# Patient Record
Sex: FEMALE | Race: BLACK OR AFRICAN AMERICAN
[De-identification: names, ages, dates, MRNs, and addresses within clinical notes are randomized per-mention and may not be internally consistent; named-entity substitution may affect disease eponyms.]

---

## 2022-11-24 ENCOUNTER — HOSPITAL ENCOUNTER (EMERGENCY)
Dept: HOSPITAL 97 - ER | Age: 22
Discharge: HOME | End: 2022-11-24
Payer: SELF-PAY

## 2022-11-24 VITALS — OXYGEN SATURATION: 100 % | SYSTOLIC BLOOD PRESSURE: 138 MMHG | DIASTOLIC BLOOD PRESSURE: 100 MMHG | TEMPERATURE: 97.1 F

## 2022-11-24 DIAGNOSIS — H66.91: Primary | ICD-10-CM

## 2022-11-24 DIAGNOSIS — E11.9: ICD-10-CM

## 2022-11-24 PROCEDURE — 96372 THER/PROPH/DIAG INJ SC/IM: CPT

## 2022-11-24 PROCEDURE — 99283 EMERGENCY DEPT VISIT LOW MDM: CPT

## 2022-11-24 NOTE — EDPHYS
Physician Documentation                                                                           

 [facility]                                                                 

Name: Cass Anderson                                                                             

Age: 22 yrs                                                                                       

Sex: Female                                                                                       

: 2000                                                                                   

MRN: [id number]                                                                                   

Arrival Date: 2022                                                                          

Time: 07:38                                                                                       

Account#: [id number]                                                                            

Bed 14                                                                                            

Private MD:                                                                                       

ED Physician Mekhi Engel                                                                         

HPI:                                                                                              

                                                                                             

07:48 This 22 yrs old Black Female presents to ER via Unassigned with complaints of Ear Pain. kb  

07:48 The patient presents with pain, moderate. The complaints affect the right ear. Onset:   kb  

      The symptoms/episode began/occurred last night. Modifying factors: The symptoms are         

      alleviated by nothing, the symptoms are aggravated by nothing. Associated signs and         

      symptoms: The patient has no apparent associated signs or symptoms. Severity of             

      symptoms: At their worst the symptoms were moderate in the emergency department the         

      symptoms are unchanged. The patient has not experienced similar symptoms in the past.       

      The patient has not recently seen a physician.                                              

                                                                                                  

Historical:                                                                                       

- Allergies:                                                                                      

07:49 No Known Allergies;                                                                     ss  

- Home Meds:                                                                                      

07:49 metformin 750 mg Oral Tb24 1 tab once daily [Active];                                   ss  

- PMHx:                                                                                           

07:49 Diabetes mellitus;                                                                      ss  

- PSHx:                                                                                           

07:49 None;                                                                                   ss  

                                                                                                  

- Immunization history:: Adult Immunizations up to date.                                          

- Social history:: Smoking status: Patient denies any tobacco usage or history of.                

                                                                                                  

                                                                                                  

ROS:                                                                                              

07:47 Constitutional: Negative for fever, chills, and weight loss.                            kb  

07:47 ENT: Positive for ear pain.                                                                 

07:47 All other systems are negative.                                                             

                                                                                                  

Exam:                                                                                             

07:47 Constitutional:  This is a well developed, well nourished patient who is awake, alert,  kb  

      and in no acute distress. Head/Face:  Normocephalic, atraumatic. Cardiovascular:            

      Regular rate and rhythm with a normal S1 and S2.  No gallops, murmurs, or rubs.  No         

      pulse deficits. Respiratory:  Respirations even and unlabored. No increased work of         

      breathing. Talking in full sentences Skin:  Warm, dry with normal turgor.  Normal           

      color. MS/ Extremity:  Pulses equal, no cyanosis.  Neurovascular intact.  Full, normal      

      range of motion. Neuro:  Awake and alert, GCS 15, oriented to person, place, time, and      

      situation. Moves all extremities. Normal gait. Psych:  Awake, alert, with orientation       

      to person, place and time.  Behavior, mood, and affect are within normal limits.            

07:47 ENT: External ear(s): are unremarkable, Ear canal(s): are normal, TM's: bulging, on the     

      right, erythema, that is marked, on the right.                                              

                                                                                                  

Vital Signs:                                                                                      

07:47  / 100; Pulse 84; Resp 16; Temp 97.1(A); Pulse Ox 100% on R/A; Weight 83.01 kg;   ss  

      Pain 8/10;                                                                                  

                                                                                                  

MDM:                                                                                              

07:44 Patient medically screened.                                                             kb  

07:47 Data reviewed: vital signs, nurses notes. Data interpreted: Pulse oximetry: on room air kb  

      is 100 %. Interpretation: normal. Counseling: I had a detailed discussion with the          

      patient and/or guardian regarding: the historical points, exam findings, and any            

      diagnostic results supporting the discharge/admit diagnosis, the need for outpatient        

      follow up, a family practitioner, to return to the emergency department if symptoms         

      worsen or persist or if there are any questions or concerns that arise at home.             

                                                                                                  

Administered Medications:                                                                         

08:02 Drug: Rocephin (cefTRIAXone) 1 grams Route: IM; Site: right gluteus;                    vg1 

08:13 Follow up: Response: No adverse reaction                                                vg1 

                                                                                                  

                                                                                                  

Disposition:                                                                                      

15:15 Co-signature as Attending Physician, Mekhi PEARSON was immediately available on-site ms3 

      in the Emergency Department for consultation in the care of the patient.                    

                                                                                                  

Disposition Summary:                                                                              

22 07:48                                                                                    

Discharge Ordered                                                                                 

      Location: Home                                                                          kb  

      Condition: Stable                                                                       kb  

      Diagnosis                                                                                   

        - Otitis media, unspecified, right ear                                                kb  

      Followup:                                                                               kb  

        - With: Emergency Department                                                               

        - When: As needed                                                                          

        - Reason: Worsening of condition                                                           

      Followup:                                                                               kb  

        - With: Private Physician                                                                  

        - When: 2 - 3 days                                                                         

        - Reason: Recheck today's complaints, Continuance of care, Re-evaluation by your           

      physician                                                                                   

      Discharge Instructions:                                                                     

        - Discharge Summary Sheet                                                             kb  

        - Otitis Media, Adult, Easy-to-Read                                                   kb  

      Forms:                                                                                      

        - Medication Reconciliation Form                                                      kb  

        - Thank You Letter                                                                    kb  

        - Antibiotic Education                                                                kb  

        - Prescription Opioid Use                                                             kb  

      Prescriptions:                                                                              

        - Amoxicillin 875 mg Oral Tablet                                                           

            - take 1 tablet by ORAL route every 12 hours for 10 days; 20 tablet; Refills: 0,  kb  

      Product Selection Permitted                                                                 

Signatures:                                                                                       

Grisel Brown FNP-C FNP-Ckb Smirch, Shelby, RN                      RN   Yana Guerra RN                    RN   vg1                                                  

Engel, Mekhi, DO                        DO   ms3                                                  

                                                                                                  

**************************************************************************************************

## 2022-11-24 NOTE — ER
Nurse's Notes                                                                                     

 [facility]                                                                 

Name: Cass Anderson                                                                             

Age: 22 yrs                                                                                       

Sex: Female                                                                                       

: 2000                                                                                   

MRN: [id number]                                                                                   

Arrival Date: 2022                                                                          

Time: 07:38                                                                                       

Account#: [id number]                                                                            

Bed 14                                                                                            

Private MD:                                                                                       

Diagnosis: Otitis media, unspecified, right ear                                                   

                                                                                                  

Presentation:                                                                                     

                                                                                             

07:47 Chief complaint: Patient states: R ear pain that began last night. Denies fever.        ss  

      Coronavirus screen: Client denies travel out of the [place] in the last 14 days. Ebola         

      Screen: Patient denies exposure to infectious person. Patient denies travel to an           

      Ebola-affected area in the 21 days before illness onset. Initial Sepsis Screen: Does        

      the patient meet any 2 criteria? No. Patient's initial sepsis screen is negative. Does      

      the patient have a suspected source of infection? No. Patient's initial sepsis screen       

      is negative. Risk Assessment: Do you want to hurt yourself or someone else? Patient         

      reports no desire to harm self or others. Onset of symptoms was 2022.          

07:47 Method Of Arrival: Ambulatory                                                             

07:47 Acuity: VERN 4                                                                           ss  

                                                                                                  

Historical:                                                                                       

- Allergies:                                                                                      

07:49 No Known Allergies;                                                                     ss  

- Home Meds:                                                                                      

07:49 metformin 750 mg Oral Tb24 1 tab once daily [Active];                                   ss  

- PMHx:                                                                                           

07:49 Diabetes mellitus;                                                                      ss  

- PSHx:                                                                                           

07:49 None;                                                                                   ss  

                                                                                                  

- Immunization history:: Adult Immunizations up to date.                                          

- Social history:: Smoking status: Patient denies any tobacco usage or history of.                

                                                                                                  

                                                                                                  

Screenin:04 Abuse screen: Denies threats or abuse. Nutritional screening: No deficits noted.        vg1 

      Tuberculosis screening: No symptoms or risk factors identified. Fall Risk None              

      identified.                                                                                 

                                                                                                  

Assessment:                                                                                       

07:48 General: Appears in no apparent distress. uncomfortable, Behavior is calm, cooperative. vg1 

      Pain: Complains of pain in right ear Pain currently is 8 out of 10 on a pain scale.         

      Pain began 1 day ago. Neuro: Level of Consciousness is awake, alert, obeys commands,        

      Oriented to person, place, time, situation. Respiratory: Airway is patent Respiratory       

      effort is even, unlabored. GI: No signs and/or symptoms were reported involving the         

      gastrointestinal system. EENT: Tympanic membrane reddened on right ear.                     

                                                                                                  

Vital Signs:                                                                                      

07:47  / 100; Pulse 84; Resp 16; Temp 97.1(A); Pulse Ox 100% on R/A; Weight 83.01 kg;   ss  

      Pain 8/10;                                                                                  

                                                                                                  

ED Course:                                                                                        

07:38 Patient arrived in ED.                                                                  mr  

07:43 Grisel Brown FNP-C is [facility].                                                        kb  

07:43 Mekhi Engel DO is Attending Physician.                                                kb  

07:48 Yana Cartwright, RN is Primary Nurse.                                                  vg1 

07:49 Triage completed.                                                                       ss  

07:49 Arm band placed on right wrist.                                                         ss  

08:04 Patient has correct armband on for positive identification. Bed in low position. Call   vg1 

      light in reach. Side rails up X 1.                                                          

08:04 No provider procedures requiring assistance completed. Patient did not have IV access   vg1 

      during this emergency room visit.                                                           

                                                                                                  

Administered Medications:                                                                         

08:02 Drug: Rocephin (cefTRIAXone) 1 grams Route: IM; Site: right gluteus;                    vg1 

08:13 Follow up: Response: No adverse reaction                                                vg1 

                                                                                                  

                                                                                                  

Medication:                                                                                       

08:04 VIS not applicable for this client.                                                     vg1 

                                                                                                  

Outcome:                                                                                          

07:48 Discharge ordered by MD.                                                                kb  

08:04 Discharged to home ambulatory.                                                          vg1 

08:04 Condition: good                                                                             

08:04 Discharge instructions given to patient, Instructed on discharge instructions, follow       

      up and referral plans. medication usage, Demonstrated understanding of instructions,        

      follow-up care, medications, Prescriptions given X 1.                                       

08:13 Patient left the ED.                                                                    vg1 

                                                                                                  

Signatures:                                                                                       

Grisel Brown FNP-C FNP-[place]                                                   

Trudy Lundberg                                                   

Risa Marcum, MILENA                      RN   Yana Guerra, RN                    RN   vg1                                                  

                                                                                                  

**************************************************************************************************

## 2023-06-25 ENCOUNTER — HOSPITAL ENCOUNTER (EMERGENCY)
Dept: HOSPITAL 97 - ER | Age: 23
Discharge: HOME | End: 2023-06-25
Payer: SELF-PAY

## 2023-06-25 DIAGNOSIS — E11.9: ICD-10-CM

## 2023-06-25 DIAGNOSIS — L03.011: Primary | ICD-10-CM

## 2023-06-25 PROCEDURE — 96372 THER/PROPH/DIAG INJ SC/IM: CPT

## 2023-06-25 PROCEDURE — 99284 EMERGENCY DEPT VISIT MOD MDM: CPT

## 2023-06-25 PROCEDURE — 0H9QX0Z DRAINAGE OF FINGER NAIL WITH DRAINAGE DEVICE, EXTERNAL APPROACH: ICD-10-PCS

## 2023-06-25 NOTE — EDPHYS
Physician Documentation                                                                           

 [facility]                                                                 

Name: Cass Anderson                                                                             

Age: 23 yrs                                                                                       

Sex: Female                                                                                       

: 2000                                                                                   

MRN: [id number]                                                                                   

Arrival Date: 2023                                                                          

Time: 15:00                                                                                       

Account#: [id number]                                                                            

Bed Treatment                                                                                     

Private MD:                                                                                       

ED Physician Bladimir Lazaro                                                                      

HPI:                                                                                              

                                                                                             

18:08 This 23 yrs old Black Female presents to ER via Ambulatory with complaints of thumb     snw 

      infection.                                                                                  

18:08 Onset: The symptoms/episode began/occurred acutely. The patient has experienced a       snw 

      previous episode. The patient has not recently seen a physician.                            

                                                                                                  

Historical:                                                                                       

- Allergies:                                                                                      

15:08 No Known Allergies;                                                                     ll1 

- PMHx:                                                                                           

15:08 diabetes mellitus;                                                                      ll1 

- PSHx:                                                                                           

15:08 None;                                                                                   ll1 

                                                                                                  

- Immunization history:: Client reports having NOT received the Covid vaccine.                    

- Social history:: Smoking status: Patient denies any tobacco usage or history of.                

                                                                                                  

                                                                                                  

ROS:                                                                                              

16:18 Constitutional: Negative for fever, chills, and weight loss, Eyes: Negative for injury, snw 

      pain, redness, and discharge, ENT: Negative for injury, pain, and discharge, Neck:          

      Negative for injury, pain, and swelling, Cardiovascular: Negative for chest pain,           

      palpitations, and edema, Respiratory: Negative for shortness of breath, cough,              

      wheezing, and pleuritic chest pain, Abdomen/GI: Negative for abdominal pain, nausea,        

      vomiting, diarrhea, and constipation, Back: Negative for injury and pain, : Negative      

      for injury, bleeding, discharge, and swelling, MS/Extremity: Negative for injury and        

      deformity, Neuro: Negative for headache, weakness, numbness, tingling, and seizure,         

      Psych: Negative for depression, anxiety, suicide ideation, homicidal ideation, and          

      hallucinations.                                                                             

16:18 Skin: Positive for swelling, of the right thumbnail.                                        

                                                                                                  

Exam:                                                                                             

16:14 Constitutional:  This is a well developed, well nourished patient who is awake, alert,  snw 

      and in no acute distress. Head/Face:  Normocephalic, atraumatic. Eyes:  Pupils equal        

      round and reactive to light, extra-ocular motions intact.  Lids and lashes normal.          

      Conjunctiva and sclera are non-icteric and not injected.  Cornea within normal limits.      

      Periorbital areas with no swelling, redness, or edema. ENT:  Nares patent. No nasal         

      discharge, no septal abnormalities noted.  Tympanic membranes are normal and external       

      auditory canals are clear.  Oropharynx with no redness, swelling, or masses, exudates,      

      or evidence of obstruction, uvula midline.  Mucous membranes moist. Neck:  Trachea          

      midline, no thyromegaly or masses palpated, and no cervical lymphadenopathy.  Supple,       

      full range of motion without nuchal rigidity, or vertebral point tenderness.  No            

      Meningismus. Chest/axilla:  Normal chest wall appearance and motion.  Nontender with no     

      deformity.  No lesions are appreciated. Cardiovascular:  Regular rate and rhythm with a     

      normal S1 and S2.  No gallops, murmurs, or rubs.  Normal PMI, no JVD.  No pulse             

      deficits. Respiratory:  Lungs have equal breath sounds bilaterally, clear to                

      auscultation and percussion.  No rales, rhonchi or wheezes noted.  No increased work of     

      breathing, no retractions or nasal flaring. Abdomen/GI:  Soft, non-tender, with normal      

      bowel sounds.  No distension or tympany.  No guarding or rebound.  No evidence of           

      tenderness throughout. Back:  No spinal tenderness.  No costovertebral tenderness.          

      Full range of motion. MS/ Extremity:  Pulses equal, no cyanosis.  Neurovascular intact.     

       Full, normal range of motion. Neuro:  Awake and alert, GCS 15, oriented to person,         

      place, time, and situation.  Cranial nerves II-XII grossly intact.  Motor strength 5/5      

      in all extremities.  Sensory grossly intact.  Cerebellar exam normal.  Normal gait.         

      Psych:  Awake, alert, with orientation to person, place and time.  Behavior, mood, and      

      affect are within normal limits.                                                            

16:14 Musculoskeletal/extremity: Extremities: all appear grossly normal, with no appreciated      

      pain with palpation, ROM: no acute changes, Circulation is intact in all extremities.       

16:14 Skin: Appearance: normal except for affected area, injury, paronychia to right thumb.       

                                                                                                  

Vital Signs:                                                                                      

15:09  / 86; Pulse 91; Resp 16; Temp 97.9; Pulse Ox 98% ; Weight 84.82 kg; Height 5 ft. ll1 

      0 in. ; Pain 7/10;                                                                          

15:09 Body Mass Index 36.52 (84.82 kg, 152.4 cm)                                              ll1 

15:09 Pain Scale: Adult                                                                       ll1 

                                                                                                  

Procedures:                                                                                       

20:13 I \T\ D: Incision and drainage was performed for an abscess of the right Prepped with     snw

      Betadine, Anesthetized with lido jelly. Incised with needle. Drained moderate amount        

      purulent fluid. Abscess cavity explored. Dressing: bandaid the patient tolerated the        

      procedure well.                                                                             

                                                                                                  

MDM:                                                                                              

15:12 Patient medically screened.                                                             santosh 

18:12 Differential diagnosis: bacterial infection, paronychia, ingrown nail. Data reviewed:   snw 

      vital signs, nurses notes. Counseling: I had a detailed discussion with the patient         

      and/or guardian regarding: the historical points, exam findings, and any diagnostic         

      results supporting the discharge/admit diagnosis, the need for outpatient follow up,        

      for definitive care, to return to the emergency department if symptoms worsen or            

      persist or if there are any questions or concerns that arise at home. Response to           

      treatment: the patient's symptoms have mildly improved after treatment. Special             

      discussion: Based on the history and exam findings, there is no indication for further      

      emergent testing or inpatient evaluation. I discussed with the patient/guardian the         

      need to see the primary care provider for further evaluation of the symptoms.               

                                                                                                  

Administered Medications:                                                                         

16:23 Drug: Lidocaine Mucous Membrane Gel 2 % 1 ea Volume: 15 ml; Route: Mucous Membrane;     cm10

18:21 Not Given (Pt doesn't have a ridee): traMADol PO 50 mg PO once                          cm10

18:26 Drug: Boostrix Tdap IM 0.5 ml Route: IM; Site: right deltoid;                           cm10

18:26 Drug: Doxycycline  mg Route: PO;                                                  cm10

                                                                                                  

                                                                                                  

Disposition Summary:                                                                              

23 18:12                                                                                    

Discharge Ordered                                                                                 

      Location: Home                                                                          snw 

      Condition: Stable                                                                       snw 

      Diagnosis                                                                                   

        - Paronychia                                                                          snw 

      Followup:                                                                               snw 

        - With: Emergency Department                                                               

        - When: As needed                                                                          

        - Reason: Worsening of condition                                                           

      Followup:                                                                               snw 

        - With: Private Physician                                                                  

        - When: 5 - 6 days                                                                         

        - Reason: Recheck today's complaints, Continuance of care, Re-evaluation by your           

      physician                                                                                   

      Discharge Instructions:                                                                     

        - Discharge Summary Sheet                                                             snw 

        - Paronychia                                                                          snw 

      Forms:                                                                                      

        - Medication Reconciliation Form                                                      snw 

        - Thank You Letter                                                                    snw 

        - Antibiotic Education                                                                snw 

        - Prescription Opioid Use                                                             snw 

      Prescriptions:                                                                              

        - Mobic 7.5 mg Oral Tablet                                                                 

            - take 1 tablet by ORAL route once daily take with food; 20 tablet; Refills: 0,   snw 

      Product Selection Permitted                                                                 

        - Doxycycline Hyclate 100 mg Oral Tablet                                                   

            - take 1 tablet by ORAL route every 12 hours; 20 tablet; Refills: 0, Product      snw 

      Selection Permitted                                                                         

Signatures:                                                                                       

Bladimir Lazaro MD MD cha Waters, Shelly, ESTEFANÍA-C                   FNP-Liv Blair, RN                       RN   ll1                                                  

Esther Laboy RN                  RN   cm10                                                 

                                                                                                  

**************************************************************************************************

## 2023-06-25 NOTE — XMS REPORT
Continuity of Care Document

                            Created on:2023



Patient:DEVIN BETANCUR

Sex:Female

:2000

External Reference #:[id number]





Demographics







                          Address                   [address]



                                                    [place] [address]

 

                          Home Phone                [phone]

 

                          Work Phone                [phone]

 

                          Mobile Phone              [phone]

 

                          Email Address             [email or web address]

 

                          Preferred Language        English

 

                          Marital Status            Unknown

 

                          Hoahaoism Affiliation     Unknown

 

                          Race                      Unknown

 

                          Additional Race(s)        Unavailable

 

                          Ethnic Group              Unknown









Author







                          Organization              [facility]

t

 

                          Address                   [address]



                                                    [place] [address]

 

                          Phone                     [phone]









Support







                Name            Relationship    Address         Phone

 

                AMY BARRON               [address] [phone]



                                                [place] [address] 









Care Team Providers







                    Name                Role                Phone

 

                    ZACHARY DUONG  Primary Care Physician Unavailable

 

                    RAE MO     Attending Clinician Unavailable

 

                    RAE Smith Attending Clinician [phone]

 

                    ZACHARY DUONG  Attending Clinician Unavailable

 

                    IGNACIO PANTOJA        Attending Clinician Unavailable

 

                    Dennis Tobarya S  Attending Clinician [phone]

 

                    Cheryle Tobar MD Attending Clinician [phone]

 

                    CHERYLE TOBAR    Attending Clinician Unavailable

 

                    AILYN HERNANDEZ Attending Clinician Unavailable

 

                    Zachary Duong MD Attending Clinician [phone]

 

                    VINAYAK ZHU     Attending Clinician Unavailable

 

                    Lab, Javed Cbc        Attending Clinician Unavailable

 

                    SAMEER BAXTER       Attending Clinician Unavailable

 

                    Sameer Reese   Attending Clinician [phone]

 

                    Shar Tyler MD Attending Clinician [phone]

 

                    SHAR TYLER   Attending Clinician Unavailable

 

                    Doctor Unassigned, No Name Attending Clinician Unavailable

 

                    DARON WEI    Attending Clinician Unavailable

 

                    Jessica Hennessy Attending Clinician [phone]

 

                    AURELIANO LIN    Attending Clinician Unavailable

 

                    Nurse, Gal Adult [facility]gent Attending Clinician Unavailable

 

                    Aureliano Lin MD Attending Clinician [phone]

 

                    Daron Leach Attending Clinician [phone]

 

                    Justin Talley DO Attending Clinician [phone]

 

                    YAMIL RICHMOND    Attending Clinician Unavailable

 

                    Yamil Corado Attending Clinician [phone]

 

                    MARVIN EUBANKS   Attending Clinician Unavailable

 

                    Cem RIOJASP, Nguyen Gamboa Attending Clinician [phone]

 

                    MAE ABRAHAM          Attending Clinician Unavailable

 

                    ZAINA KAMARA        Attending Clinician Unavailable

 

                    Morris JOSÉ, Lizbeth    Attending Clinician [phone]

 

                    LIZBETH CACERES        Attending Clinician Unavailable

 

                    Zachary ABBOTT, Niesha   Attending Clinician Unavailable

 

                    Leighton SOTOMAYOR, Aracely Cadena Attending Clinician [phone]

 

                    MARIE RADER       Attending Clinician Unavailable

 

                    Jonh ABBOTT, Hilary GLASGOW Attending Clinician Unavailable

 

                    Nurse, Pcp [facility]      Attending Clinician Unavailable

 

                    Silas SOTOMAYOR, Junior Attending Clinician [phone]

 

                    JUNIOR BARROS    Attending Clinician Unavailable

 

                    AJAY PETER  Attending Clinician Unavailable

 

                    Jono Smalls RN  Attending Clinician Unavailable

 

                    Pcp, Patient Does Not Have A Attending Clinician [phone]

[phone]

 

                    Natividad Vargas PA-C Attending Clinician [phone]

 

                    NATIVIDAD VARGAS    Attending Clinician Unavailable

 

                    RAE MO     Admitting Clinician Unavailable

 

                    RICK EDWARDS      Admitting Clinician Unavailable

 

                    SAMEER BAXTER       Admitting Clinician Unavailable

 

                    SHAR TYLER   Admitting Clinician Unavailable

 

                    YAMIL RICHMOND    Admitting Clinician Unavailable









Payers







           Payer Name Policy Type Policy Number Effective Date Expiration Date S

ource MEDICAID PENDING            PENDING    2021 00:00:00           

 

 

           [facility]            [id number] 2022 00:00:00            







Problems







       Condition Condition Condition Status Onset  Resolution Last   Treating Co

mments 

Source



       Name   Details Category        Date   Date   Treatment Clinician        



                                                 Date                 

 

       Rubella Rubella Disease Active                              [facility]



       non-immune non-immune                                              it

y of



       status, status,               00:00:                             [place]



       antepartum antepartum               00                                 Me

dical



                                                                      Branch

 

       Maternal Maternal Disease Active                              Unive

rs



       varicella, varicella,                                              it

y of



       non-immune non-immune               00:00:                             [place]

[place]



                                   00                                 Medical



                                                                      Branch

 

       History of History of Disease Active                              U

nivers



       gestationa gestationa               8-23                               it

y of



       l diabetes l diabetes               00:00:                             Te

[place]



       in prior in prior               00                                 Medica

l



       pregnancy, pregnancy,                                                  Br

anch



       currently currently                                                  



       pregnant pregnant                                                  



       in first in first                                                  



       trimester trimester                                                  

 

       Family Family Disease Active                              Univers



       history of history of                                              it

y of



       diabetes diabetes               00:00:                             [place]



       mellitus mellitus               00                                 Medica

l



                                                                      Branch

 

       History of History of Disease Active                              U

nivers



       gestationa gestationa               8                               it

y of



       l      l                    00:00:                             [place]



       hypertensi hypertensi               00                                 Me

dical



       on     on                                                      Branch

 

       Hearing Hearing Disease Active                       Overview: Univ

ers



       loss   loss                 4-30                        Formattin ity of



                                   00:00:                      g of this [place]



                                   00                          note   Medical



                                                               might be Branch



                                                               different 



                                                               from the 



                                                               original. 



                                                               ICD10  



                                                               Diagnosis 



                                                               Term   



                                                               Replacer 



                                                               Utility 







Allergies, Adverse Reactions, Alerts







       Allergy Allergy Status Severity Reaction(s) Onset  Inactive Treating Comm

ents 

Source



       Name   Type                        Date   Date   Clinician        

 

       NO KNOWN Drug   Active                                           [facility]



       ALLERGIE Class                                                   ity of



       S                                                              [facility]







Social History







           Social Habit Start Date Stop Date  Quantity   Comments   Source

 

           History SDOH                                             University o

f



           Alcohol Frequency                                             [place] [facility]

edical



                                                                  Branch

 

           History SDOH                                             University o

f



           Alcohol Std                                             [place] [facility]



           Drinks                                                 Branch

 

           History SDOH                                             University o

f



           Alcohol Binge                                             [place] [facility]

al



                                                                  Branch

 

           Exposure to 2022 Unable to assess            Univers

ity of



           SARS-CoV-2 00:00:00   08:49:00                         [facility]



           (event)                                                Branch

 

           Alcohol intake 2022 Current drinker            Unive

rsity of



                      00:00:00   00:00:00   of alcohol            [place] [facility]



                                            (finding)             [facility]

 

           Alcohol Comment 2021 socially              Universit

y of



                      00:00:00   00:00:00                         [facility]

 

           Tobacco use and 2017 Smokeless tobacco            Un

iversity of



           exposure   00:00:00   00:00:00   non-user              [facility]

 

           Sex Assigned At 2000                       Universit

y of



           Birth      00:00:00   00:00:00                         [facility]









                Smoking Status  Start Date      Stop Date       Source

 

                Never smoked tobacco                                 [facility]







Medications







       Ordered Filled Start  Stop   Current Ordering Indication Dosage Frequency

 Signature

                    Comments            Components          Source



     Medication Medication Date Date Medication? Clinician                (SIG) 

          



     Name Name                                                   

 

     ibuprofen                   600mg      600 mg,           Uni

vers



     (IBU)      9-16 09-16                          Oral,           ity of



     tablet 600      14:45: 14:52                          ONCE, 1           Javed

as



     mg        00   :00                           dose, On           Medical



                                                  Fri            Branch



                                                  22 at           



                                                  0945, ASAP           

 

     maalox:diph      2022- No             15mL      15 mL,           Uni

vers



     enhydrAMINE      5 05-07                          Oral,           ity of



     :lidocaine      03:45: 03:19                          ONCE, 1           Javed

as



     2 % viscous      00   :00                           dose, On           Medi

rafia



     1:1:1                                         Fri 22           Branch



     (FIRST-MOUT                                         at 2245,           



     [facility]                                         Routine           



     oral                                                        



     suspension                                                        



     15 mL                                                        

 

     pantoprazol      0      Yes       [id number] 40mg      Take 1           

Univers



     e         5-06                               tablet by           ity of



     (PROTONIX)      00:00:                               mouth           [place]



     40 mg EC      00                                 daily.           Medical



     tablet                                                        Branch

 

     dicyclomine      0      Yes       [id number] 20mg      Take 1           

Univers



     20 mg      5-06                               tablet by           ity of



     tablet      00:00:                               mouth           [place]



               00                                 every 6           Medical



                                                  (six)           Branch



                                                  hours as           



                                                  needed for           



                                                  Abdominal           



                                                  pain.           

 

     ondansetron      0      Yes       [id number] 4mg       Take 1           

Univers



     (ZOFRAN) 4      5-06                               tablet by           ity 

of



     mg tablet      00:00:                               mouth           [place]



               00                                 every 8           Medical



                                                  (eight)           Branch



                                                  hours as           



                                                  needed for           



                                                  Nausea and           



                                                  Vomiting           



                                                  (N/V).           

 

     pantoprazol      0      Yes       [id number] 40mg      Take 1           

Univers



     e         5-06                               tablet by           ity of



     (PROTONIX)      00:00:                               mouth           [place]



     40 mg EC      00                                 daily.           Medical



     tablet                                                        Branch

 

     dicyclomine      0      Yes       [id number] 20mg      Take 1           

Univers



     20 mg      5-06                               tablet by           ity of



     tablet      00:00:                               mouth           [place]



               00                                 every 6           Medical



                                                  (six)           Branch



                                                  hours as           



                                                  needed for           



                                                  Abdominal           



                                                  pain.           

 

     ondansetron      -0      Yes       [id number] 4mg       Take 1           

Univers



     (ZOFRAN) 4      5-06                               tablet by           ity 

of



     mg tablet      00:00:                               mouth           [place]



               00                                 every 8           Medical



                                                  (eight)           Branch



                                                  hours as           



                                                  needed for           



                                                  Nausea and           



                                                  Vomiting           



                                                  (N/V).           

 

     clindamycin      0 - No        [id number] 300mg      Take 1        

   Univers



     300 mg      4-21 -29                          capsule by           ity of



     capsule      00:00: 04:59                          mouth 2           [place]



               00   :00                           (two)           Medical



                                                  times           Branch



                                                  daily for           



                                                  7 days.           

 

     SITagliptin      -0      Yes       [id number] 100mg      Take 1         

  Univers



     100 mg      4-13                               tablet by           ity of



     tablet      00:00:                               mouth           [place]



               00                                 daily.           Medical



                                                                 Branch

 

     SITagliptin      -0      Yes       [id number] 100mg      Take 1         

  Univers



     100 mg      4-13                               tablet by           ity of



     tablet      00:00:                               mouth           [place]



               00                                 daily.           Medical



                                                                 Branch

 

     SITagliptin      -0      Yes       [id number] 100mg      Take 1         

  Univers



     100 mg      4-13                               tablet by           ity of



     tablet      00:00:                               mouth           [place]



               00                                 daily.           Medical



                                                                 Branch

 

     metformin      -0      Yes       [id number] 2000mg      Take 4          

 Univers



      mg      4-06                               tablets by           ity 

of



     24 hr      00:00:                               mouth           [place]



     tablet      00                                 daily with           Medical



                                                  breakfast.           Branch

 

     metformin      -0      Yes       [id number] 2000mg      Take 4          

 Univers



      mg      4-06                               tablets by           ity 

of



     24 hr      00:00:                               mouth           [place]



     tablet      00                                 daily with           Medical



                                                  breakfast.           Branch

 

     metformin      -      Yes       [id number] 2000mg      Take 4          

 Univers



      mg      4-06                               tablets by           ity 

of



     24 hr      00:00:                               mouth           [place]



     tablet      00                                 daily with           Medical



                                                  breakfast.           Branch







Immunizations







           Ordered    Filled Immunization Date       Status     Comments   [facility]

e



           Immunization Name Name                                        

 

           HPV9                  2019 Completed             University of



                                 00:00:00                         [facility]

 

           HPV9                  2019 Completed             [facility] of



                                 00:00:00                         [facility]

 

           HPV9                  2019 Completed             [facility] of



                                 00:00:00                         [facility]

 

           HPV9                  2019 Completed             [facility] of



                                 00:00:00                         [facility]

 

           HPV9                  2019 Completed             [facility] of



                                 00:00:00                         [facility]

 

           HPV9                  2019 Completed             [facility] of



                                 00:00:00                         [facility]

 

           HPV9                  2018 Completed             [facility] of



                                 00:00:00                         [facility]

 

           HPV9                  2018 Completed             [facility]



                                 00:00:00                         [facility]

 

           HPV9                  2018 Completed             [facility]



                                 00:00:00                         [facility]

 

           TDAP                  2018 Completed             [facility] of



                                 00:00:00                         [facility]

 

           TDAP                  2018 Completed             [facility] of



                                 00:00:00                         [facility]

 

           TDAP                  2018 Completed             [facility] of



                                 00:00:00                         [facility]

 

           Influenza Virus            2013 Completed             Universit

y of



           Vaccine Nasal            00:00:00                         [facility]

 

           Influenza Virus            2013 Completed             Universit

y of



           Vaccine Nasal            00:00:00                         [place] [facility]

al



                                                                  [facility]

 

           Influenza Virus            2013 Completed             Universit

y of



           Vaccine Nasal            00:00:00                         [facility]

 

           HIB 4 Dose Schedule            2000 Completed             Unive

rsity of



                                 00:00:00                         [facility]

 

           HIB 4 Dose Schedule            2000 Completed             Unive

rsity of



                                 00:00:00                         [facility]

 

           MMR                   2000 Completed             University of



                                 00:00:00                         [facility]

 

           Varicella             2000 Completed             [facility]



           (varivax)(chicken            00:00:00                         [place] [facility]

[facility]



           pox)                                                   Branch

 

           DTAP                  2000 Completed             [facility]



                                 00:00:00                         [facility]

 

           HIB 4 Dose Schedule            2000 Completed             Unive

rsity of



                                 00:00:00                         [facility]

 

           Polio (IPV/OPV)            2000 Completed             Universit

y of



                                 00:00:00                         [facility]

 

           MMR                   2000 Completed             [facility]



                                 00:00:00                         [facility]

 

           Varicella             2000 Completed             [facility]



           (varivax)(chicken            00:00:00                         [place] [facility]

[facility]



           pox)                                                   Branch

 

           DTAP                  2000 Completed             [facility]



                                 00:00:00                         [facility]

 

           HIB 4 Dose Schedule            2000 Completed             Unive

rsity of



                                 00:00:00                         [facility]

 

           Polio (IPV/OPV)            2000 Completed             Universit

y of



                                 00:00:00                         [facility]

 

           Hep B, Adol or Pedi            2000 Completed             Unive

rsity of



           Dosage                00:00:00                         [facility]

 

           DTAP                  2000 Completed             [facility]



                                 00:00:00                         [facility]

 

           HIB 4 Dose Schedule            2000 Completed             Unive

rsity of



                                 00:00:00                         [facility]

 

           Polio (IPV/OPV)            2000 Completed             Universit

y of



                                 00:00:00                         [facility]

 

           Hep B, Adol or Pedi            2000 Completed             Unive

rsity of



           Dosage                00:00:00                         [facility]

 

           DTAP                  2000 Completed             [facility]



                                 00:00:00                         [facility]

 

           HIB 4 Dose Schedule            2000 Completed             Unive

rsity of



                                 00:00:00                         [facility]

 

           Polio (IPV/OPV)            2000 Completed             Universit

y of



                                 00:00:00                         [facility]

 

           DTAP                  1999 Completed             [facility]



                                 00:00:00                         [facility]



                                                                  Branch

 

           HIB 4 Dose Schedule            1999 Completed             Unive

rsity of



                                 00:00:00                         [facility]



                                                                  Branch

 

           Polio (IPV/OPV)            1999 Completed             Universit

y of



                                 00:00:00                         [facility]



                                                                  Branch

 

           DTAP                  1999 Completed             [facility]



                                 00:00:00                         [facility]



                                                                  Branch

 

           HIB 4 Dose Schedule            1999 Completed             Unive

rsity of



                                 00:00:00                         [facility]



                                                                  Branch

 

           Polio (IPV/OPV)            1999 Completed             Universit

y of



                                 00:00:00                         [facility]



                                                                  Branch

 

           DTAP                  1999 Completed             [facility]



                                 00:00:00                         [facility]



                                                                  Branch

 

           HIB 4 Dose Schedule            1999 Completed             Unive

rsity of



                                 00:00:00                         [facility]



                                                                  Branch

 

           Polio (IPV/OPV)            1999 Completed             Universit

y of



                                 00:00:00                         [facility]



                                                                  Branch

 

           Hep B, Adol or Pedi            1999 Completed             Unive

rsity of



           Dosage                00:00:00                         [facility]



                                                                  Branch

 

           DTAP                  1999 Completed             [facility]



                                 00:00:00                         [facility]



                                                                  Branch

 

           HIB 4 Dose Schedule            1999 Completed             Unive

rsity of



                                 00:00:00                         [facility]



                                                                  Branch

 

           Polio (IPV/OPV)            1999 Completed             Universit

y of



                                 00:00:00                         [facility]



                                                                  Branch

 

           Hep B, Adol or Pedi            1999 Completed             Unive

rsity of



           Dosage                00:00:00                         [facility]



                                                                  Branch

 

           DTAP                  1999 Completed             [facility]



                                 00:00:00                         [facility]



                                                                  Branch

 

           HIB 4 Dose Schedule            1999 Completed             Unive

rsity of



                                 00:00:00                         [facility]



                                                                  Branch

 

           Polio (IPV/OPV)            1999 Completed             Universit

y of



                                 00:00:00                         [facility]



                                                                  Branch

 

           Hep B, Adol or Pedi            1999 Completed             Unive

rsity of



           Dosage                00:00:00                         [facility]



                                                                  Branch

 

           Hep B, Adol or Pedi            1999 Completed             Unive

rsity of



           Dosage                00:00:00                         [facility]



                                                                  Branch

 

           Hep B, Adol or Pedi            1999 Completed             Unive

rsity of



           Dosage                00:00:00                         [facility]



                                                                  Branch

 

           Hep B, Adol or Pedi            1999 Completed             Unive

rsity of



           Dosage                00:00:00                         [facility]



                                                                  Branch

 

           Hep B, Adol or Pedi            1999 Completed             Unive

rsity of



           Dosage                00:00:00                         [facility]







Vital Signs







             Vital Name   Observation Time Observation Value Comments     Source

 

             Systolic blood 2022 13:50:07 135 mm[Hg]                Univer

sity of



             pressure                                            [facility]

 

             Diastolic blood 2022 13:50:07 68 mm[Hg]                 Unive

rsity of



             pressure                                            [facility]

 

             Heart rate   2022 13:50:07 98 /min                   Universi

ty [facility]

 

             Body temperature 2022 13:50:07 36.11 Filomena                 Univ

ersity of



                                                                 [facility]

 

             Respiratory rate 2022 13:50:07 22 /min                   Univ

ersity of



                                                                 [facility]

 

             Body height  2022 13:38:00 154.9 cm                  Universi

ty of



                                                                 [facility]

 

             Body weight  2022 13:38:00 88.86 kg                  Universi

ty of



                                                                 [facility]

 

             BMI          2022 13:38:00 37.01 kg/m2               Universi

ty [facility]

 

             Oxygen saturation in 2022 13:38:00 100 /min                  

[facility]



             Arterial blood by                                        [place] [facility]

[facility]



             Pulse oximetry                                        Branch

 

             Systolic blood 2022 05:11:00 112 mm[Hg]                Univer

sity of



             [facility]

 

             Diastolic blood 2022 05:11:00 85 mm[Hg]                 Unive

rsity of



             pressure                                            [facility]

 

             Heart rate   2022 05:11:00 85 /min                   Universi

ty [facility]

 

             Respiratory rate 2022 05:11:00 16 /min                   Univ

ersity [facility]

 

             Oxygen saturation in 2022 05:11:00 95 /min                   

[facility]



             Arterial blood by                                        [place] [facility]

[facility]



             Pulse oximetry                                        Branch

 

             Body temperature 2022 02:24:32 36.78 Filomena                 Univ

ersity of



                                                                 [facility]

 

             Body height  2022 01:43:00 154.9 cm                  Universi

ty of



                                                                 [facility]



                                                                 [facility]

 

             Body weight  2022 01:43:00 88.905 kg                 Universi

ty of



                                                                 [facility]



                                                                 [facility]

 

             BMI          2022 01:43:00 37.03 kg/m2               Universi

ty of



                                                                 [facility]







Procedures







                Procedure       Date / Time     Performing Clinician Source



                                Performed                       

 

                XR HAND 3+ VW LEFT 2022 14:13:53 RAE Mo [facility]

 

                CONSENT/REFUSAL FOR 2022 13:37:30 [facility]



                [facility] TREATMENT                 Name            [facility]

 

                XR CHEST 1 VW   2022 02:56:12 Rick Edwards [facility] o

[facility]

 

                LIPASE          2022 02:24:00 Cheryle Tobar [facility]

 

                TROPONIN I      2022 02:24:00 Cheryle Tobar [facility]

 

                COMP. METABOLIC PANEL 2022 02:24:00 Cheryle Tobar [facility]



                ([id number])                                         [facility]

 

                CBC WITH DIFF   2022 02:24:00 Cheryle Tobar [facility]

 

                PROTHROMBIN TIME / INR 2022 02:24:00 Cheryle Tobar [facility]

 

                ACTIVATED PARTIAL 2022 02:24:00 Cheryle Tobar [facility]



                THRMPLAS [facility]

 

                NOTICE OF PRIVACY 2022 01:27:45 Doctor Unassigned, No [facility]



                PRACTICES                       Name            [facility]

 

                CONSENT/REFUSAL FOR 2022 01:25:09 [facility]



                [facility]AGNOS[facility] TREATMENT                 Name            [facility]







Encounters







        Start   End     Encounter Admission Attending Care    Care    Encounter 

Source



        Date/Time Date/Time Type    Type    Clinicians Facility Department ID   

   

 

        2021         Emergency                 [facility]    [id number] 

Univers



        08:52:12                                                         ity of



                                                                        [facility]

 

        2021-10-30         Emergency                 [facility]    [id number] 

Univers



        22:13:37                                                         ity of



                                                                        [facility]

 

        2021-10-30         Emergency                 [facility]    [id number] 

Univers



        20:27:27                                                         ity [facility]

 

        2022 Emergency X       RAE MO [facility]    ERT     [id number]

[id number] Univers



        08:40:00 10:02:00                                                 ity [facility]

 

        2022 Emergency         RAE Mo [facility]    1.2.840.114 [id number]

[id number] Univers



        08:40:00 10:02:00                 [facility]   [facility] [id number]         i

ty of



                                                [place] [id number]         [facility]  [phone]         Medi

rafia



                                                        084             Branch

 

        [facility]       [facility]    1041

[id number] Univers



        10:00:00 10:00:00                 ZACHARY                         [facility]

 

        20[facility]       [facility]    [id number]

[id number] Univers



        10:30:00 10:30:00                 IGNACIO                          [facility]

 

        2022 Emergency         [facility]    1.2.840.1

[id number] 

Univers



        20:45:00 00:18:00                 Cheryle TobarBALJINDER [id number] 

        ity [facility] [id number]         [facility]  [phone]         [address]

 

        2022 Emergency X       CISCO [facility]    ERT     [id number]

[id number] Univers



        20:45:00 00:18:00                 CHERYLE                          [facility]

 

        [facility]       [facility]    1038

[id number] Univers



        13:30:00 13:30:00                 AILYN                         crescencio [facility]

 

        2022 [facility]       [facility]    1038

[id number] Univers



        13:30:00 13:30:00                 AILYN                         [facility]

 

        2022 [facility]         [facility]    1.2.840.114 [id number]

[id number] Univers



        00:00:00 00:00:00 Secure [facility]  [id number]        

 ity of



                                                [place]   [id number]         [facility]    [phone]         Medi

rafia



                                                PRIMARY & 365             Branch



                                                SPECIALTY                 



                                                CARE                    

 

        [facility]    [id number]

[id number] Univers



        11:20:00 11:20:00                 VINAYAK                            [facility]

 

        2022 Outpatient R       MARKO [facility]    [id number]

[id number] Univers



        11:20:00 11:20:00                 [facility]

 

        2022 Technician         Lab, [facility]    1.2.840.11

4 [id number] Univers



        15:15:00 15:30:00 Visit           Rhoda [place] [id number]

         ity of



                                                [place]   [id number]         [facility]    [phone]         Medi

rafia



                                                PRIMARY & 357             Branch



                                                SPECIALTY                 



                                                CARE                    

 

        2022 Outpatient GLORIA DUONG [facility]    1038

[id number] Univers



        13:40:00 15:03:09                 [facility]

 

        2022 Office          Rhoda [facility]    1.2.840.114 [id number]

[id number] Univers



        13:40:00 15:03:09 Visit           [place] [id number]         it

y of



                                                [place]   [id number]         [facility]    [phone]         Medi

rafia



                                                PRIMARY & 365             Branch



                                                SPECIALTY                 



                                                CARE                    

 

        2022 Outpatient GLORIA DUONG [facility]    1038

[id number] Univers



        13:40:00 15:03:09                 [facility]

 

        2022 Outpatient GLORIA DUONG [facility]    1038

[id number] Univers



        13:40:00 15:03:09                 [facility]

 

        2022 Outpatient GLORIA DUONG [facility]    1038

[id number] Univers



        11:00:00 11:00:00                 [facility]

 

        2022 Outpatient GLORIA DUONG [facility]    1037

[id number] Univers



        09:20:00 09:20:00                 [facility]

 

        2022 Outpatient GLORIA DUONG [facility]    1037

[id number] Univers



        09:20:00 09:20:00                 ZACHARY prather [facility]

 

        2022 Refbijan          Rhoda [facility]    1.2.840.114 [id number]

[id number] Univers



        00:00:00 00:00:00                 Zachary [facility]  [id number]         it

y of



                                                [place]   [id number]         [place]

[place]



                                                [place]    [phone]         Medi

rafia



                                                PRIMARY & 365             Branch



                                                SPECIALTY                 



                                                CARE                    

 

        2022 Refbijan          [facility]    1.2.840.114 [id number]

[id number] Univers



        00:00:00 00:00:00                 Zachary [facility]ALTH  [id number]         it

y of



                                                [place]   [id number]         [facility]    [phone]         Medi

rafia



                                                PRIMARY & 365             Branch



                                                SPECIALTY                 



                                                CARE                    

 

        2022 Emergency X       SAMEER BAXTER [facility]    ERT     [id number]

[id number] Univers



        12:43:00 18:40:00                                                 ity of



                                                                        [facility]

 

        2022 Emergency         Sameer Baxter [facility]    1.2.840.114 

[id number] Univers



        12:43:00 18:40:00                 T       HEALTH  [id number]         it

y of



                                                [facility]  [id number]         [facility]    [phone]         [address]                 



                                                ([facility])                   

 

        2022 Patient         Rhoda [facility]    1.2.840.114 [id number]

[id number] Univers



        00:00:00 00:00:00 Secure Msg         Zachary [facility]ALTH  [id number]        

 ity of



                                                [place]   [id number]         [place]

[place]



                                                [place]    [phone]         Medi

rafia



                                                PRIMARY & [facility]             Branch



                                                SPECIALTY                 



                                                CARE                    

 

        2022-02-10 2022-02-10 Outpatient GLORIA HERNANDEZ [facility]    1037

[id number] Univers



        13:00:00 13:00:00                 AILYN prather [facility]

 

        2022-02-10 2022-02-10 Outpatient GLORIA HERNANDEZ [facility]    1037

[id number] Univers



        13:00:00 13:00:00                 AILYN prather [facility]

 

        2022 [facility]         [facility] [place] 1.2.840.114 [id number]

[id number] Univers



        08:31:00 23:59:00 Encounter         Shar MULLEN       350.1.13.10         

ity of



                                                BUILD[place] [id number]         Javed

as



                                                        [phone]         [address]

 

        2022 Outpatient GLORIA TYLER [facility]    ACO     [id number]

[id number] Univers



        00:00:00 23:59:00                 SHAR                         ity [facility]

 

        2022 Technician         Lab, [facility]    1.2.840.11

4 [id number] Univers



        15:30:00 15:45:00 Visit           Rhoda [facility]  350.1.13.10

         ity of



                                                [place]   [id number]         [facility]    [phone]         Medi

rafia



                                                PRIMARY & 357             Branch



                                                SPECIALTY                 



                                                CARE                    

 

        2022 Office          Rhoda [facility]    1.2.840.114 [id number]

[id number] Univers



        14:40:00 15:00:00 Visit           [facility]  350.1.13.10         it

y of



                                                [place]   4.2.7.2.[address] [place] [address]    [phone]         Medi

rafia



                                                PRIMARY & 365             Branch



                                                SPECIALTY                 



                                                CARE                    

 

        2022 Outpatient GLORIA DUONG [facility]    AC     1037

[id number] Univers



        14:40:00 14:40:00                 [facility]

 

        2022 Outpatient GLORIA DUONG [facility]    ACO     1037

[id number] Univers



        14:40:00 14:40:00                 [facility]

 

        2022 [facility]         VALARIE TylerPROSPER 1.2.840.114 [id number]

[id number] Univers



        08:28:00 08:30:00 Encounter         Shar MULLEN       350.1.13.10         

ity of



                                                [facility] 4.2.7.2.686         Javed

as



                                                        [phone]         [address]

 

        2022 Outpatient GLORIA TYLER [facility]    ACO     13032

[id number] Univers



        00:00:00 08:30:00                 SHAR prather of



                                                                        [facility]

 

        2022 Orders          Doctor  SHANTHI    1.2.840.114 [id number]

80 Univers



        00:00:00 00:00:00 Only            Unassigned, KAREN   350.1.13.10       

  ity of



                                        [facility] 4.2.7.2.686         Javed

as



                                                        [phone]         Medi

rafia



                                                        009             Branch

 

        [facility]    1036

[id number] Univers



        10:15:00 10:15:00                 DARON prather [facility]

 

        2021 Emergency         Robertson, TRAUMA  1.2.899.822 [id number]

4807 Univers



        14:06:00 15:04:00                 Jessica KWONG  350.1.13.10         it

y of



                                                        4.2.7.2.686         [place]

[place]



                                                        [phone]         Medi

rafia



                                                        014             Branch

 

        2021-04-10 2021-04-10 Outpatient GLORIA LIN [facility]    [id number]

471 Univers



        12:45:00 12:45:00                 AURELIANO prather [facility]

 

        2021-04-10 2021-04-10 Laboratory         Nurse, Gal [facility]   

 1.2.840.114 

[id number]                                Univers



        12:29:43 12:44:43 Only            Aureliano [place]  350.1.13.10  

       ity of



                                                Pediatric 4.2.7.2.686         [facility]    [phone]         Medi

rafia



                                                        370             Branch

 

        [facility]    1032

[id number] Univers



        09:15:00 09:15:00                 DARON prather of



                                                                        [facility]

 

        2021 Joss Wei [facility]    1.2.840.114 [id number]

[id number] Univers



        00:00:00 00:00:00 Management         Daron   OB/GYN  350.1.13.10        

 ity of



                                                REGIONAL 4.2.7.2.686         Javed

as



                                                MATERNAL [phone]         Med

ical



                                                & CHILD [address] [place] [address]



                                                HEALTH                  



                                                [facility]                 

 

        2021 Azael Wei [facility]    1.2.840.114 [id number]

[id number] Univers



        00:00:00 00:00:00                 Daron   OB/GYN  350.1.13.10         it

y of



                                                REGIONAL 4.2.7.2.686         Javed

as



                                                MATERNAL [phone]         Med

ical



                                                & [address]                 

 

        2021 [facility]          [facility]    1.2.840.114 827

[id number] Univers



        14:56:05 16:42:23 Visit           Daron   OB/GYN  350.1.13.10         it

y of



                                                REGIONAL 4.2.7.2.686         Javed

as



                                                MATERNAL [phone]         Med

ical



                                                & [address]                 

 

        2021 [facility]       [facility]    1031

[id number] Univers



        15:30:00 15:30:00                 DARON                           [facility]

 

        2021 [facility]         [facility]    1.2.840.114 [id number]

17 Univers



        00:00:00 00:00:00 Outreach         [facility] 350.1.13.10         i

ty [facility]    4.2.7.2.686         [place]

[place]



                                                SHARAON [phone]         [facility]



                                                        388             [place]

 

        2021 2021-03-10 [facility]     [id number]

17 Univers



        22:51:00 01:31:00                 YAMIL                         ity [facility]

 

        2021 2021-03-10 Emergency         [place],  TRAUMA  1.2.752.293 [id number]

7582 Univers



        22:51:00 01:31:00                 Yamil [facility]  350.1.13.10         

ity of



                                                        4.2.7.2.686         [place]

[place]



                                                        [phone]         [address]

 

        2021 Outpatient GLORIA EUBANKS [facility]    1030

[id number] Univers



        09:20:00 09:20:00                 MARVIN                          ity [facility]

 

        2021 Emergency         Carissaoman, TRAUMA  1.2.840.114 [id number]

[id number] Univers



        22:13:00 00:00:00                 Nguyen Gamboa Valera  350.1.13.10         

ity of



                                                        4.2.7.2.686         [place]

[place]



                                                        [phone]         Medi

rafia



                                                        014             Branch

 

        2021 Emergency         [place],  TRAUMA  1.2.947.489 [id number]

1191 Univers



        21:08:00 21:48:00                 Yamil [facility]  350.1.13.10         

ity of



                                                        4.2.7.2.686         [place]

[place]



                                                        [phone]         Medi

rafia



                                                        014             Branch

 

        2021-01-15 2021-01-15 Outpatient P       MAE ABRAHAM [facility]    1030

[id number] Univers



        12:45:00 12:45:00                                                 ity [facility]

 

        2021 [facility]       [facility]    [id number]

349 Univers



        14:30:00 14:30:00                 ZAINA                           [facility]

 

        2021 Telephone         JANEEN Caceres 1.2.840.114 [id number]

[id number] Univers



        00:00:00 00:00:00                 [facility] 350.1.13.10         i

ty of



                                                CLINICS 4.2.7.2.686         [place]

[place]



                                                        [phone]         [address]

 

        2020 Outpatient R               [facility]    [id number]

016 Univers



        13:00:00 13[facility]00:00                                                 [facility]

 

        2020 Office          JANEEN Caceres 1.2.616.110 [id number]

7809 Univers



        12:54:29 14:10:03 Visit           [facility] 350.1.13.10         i

ty of



                                                CLINICS 4.2.7.2.686         [place]

[place]



                                                        [phone]         [address]

 

        2020 [facility]       [facility]    [id number]

627 Univers



        13:00:00 13:00:00                 [facility]

 

        2020 Nurse           SHANTHI Sharma    1.2.840.114 [id number]

84 Univers



        00:00:00 00:00:00 Triage          Niesha JONES   350.1.13.10         it

y of



                                                [facility] 4.2.7.2.686         Javed

as



                                                        [phone]         [address]

 

        2020 Orders          Doctor  SHANTHI    1.2.840.114 [id number]

79 Univers



        00:00:00 00:00:00 Only            Unassigned, KAREN   350.1.13.10       

  ity of



                                        [facility] 4.2.7.2.686         Javed

as



                                                        [phone]         Medi

rafia



                                                        009             [place]

 

        2020 [facility]patient R       [facility]    [id number]

847 Univers



        13:45:00 13:45:00                 [facility]                           ity [facility]

 

        2020 Outpatient R               [facility]    [id number]

716 Univers



        08:30:00 08:30:00                                                 ity [facility]

 

        2020 Outpatient R               [facility]    [id number]

153 Univers



        10:30:00 10:30:00                                                 [facility]

 

        2020 Office          JANEEN Caceres 1.2.786.113 [id number]

3891 Univers



        09:08:08 10:29:20 Visit           [facility] 350.1.13.10         i

ty of



                                                [facility] 4.2.7.2.686         [place]

[place]



                                                        [phone]         Medi

rafia



                                                        113             [place]

 

        2020 [facility]       [facility]    [id number]

842 Univers



        09:00:00 09:00:00                 [facility]

 

        2020 Orders          Doctor  SHANTHI    1.2.840.114 [id number]

91 Univers



        00:00:00 00:00:00 Only            Unassigned, KAREN   350.1.13.10       

  ity of



                                        [place] [facility] 4.2.7.2.686         Javed

as



                                                        [phone]         [address]

 

        2020 Outpatient R       [facility]    [id number]

580 Univers



        08:30:00 08:30:00                 [facility]

 

        2020 Telephone         [facility]    1.2.361.756 [id number]

8640 Univers



        00:00:00 00:00:00                 Aracely   PRIMARY 350.1.13.10         it

y of



                                        [facility]    4.2.7.2.686         Javed

as



                                                PAVILLION [phone]         Me

dical



                                                        042             Branch

 

        2020 Outpatient R       MARIE RADER [facility]    102

[id number] Univers



        18:20:00 18:20:00                                                 ity [facility]

 

        2020 Nurse           SHANTHI Borja    1.2.840.114 [id number]

36 Univers



        00:00:00 00:00:00 Triage          Hilary GLASGOW KAREN   350.1.13.10         

ity of



                                                [facility] 4.2.7.2.686         Javed

as



                                                        [phone]         Medi

rafia



                                                        019             [place]

 

        2020 Outpatient R               [facility]    [id number]

617 Univers



        13:25:00 13:25:00                                                 ity [facility]

 

        2020 Nurse           Nurse, Pcp [facility]    1.2.840.114

 [id number] Univers



        13:03:39 10:17:21 Visit           Junior Barros PRIMARY 350.1.13.10  

       ity of



                                                CARE    4.2.7.2.686         [place]

[place]



                                                PAVILLION [phone]         Me

dical



                                                        044             [place]

 

        2020 Outpatient R       SILAS [facility]    [id number]

8156 Univers



        13:00:00 13:00:00                 JUNIOR                         ity [facility]

 

        2020 Case            [facility]    1.2.840.114 [id number]

25 Univers



        00:00:00 00:00:00 Management         Aracely   PRIMARY 350.1.13.10        

 ity [facility]    4.2.7.2.686         Javed

as



                                                PAVILLION [phone]         Me

dical



                                                        044             [place]

 

        2020 Telephone         JANEEN Caceres 1.2.840.114 [id number]

[id number] Univers



        00:00:00 00:00:00                 [facility] 350.1.13.10         i

ty of



                                                [facility] 4.2.7.2.686         [place]

[place]



                                                        [phone]         Medi

rafia



                                                        113             Branch

 

        2020 Outpatient R               [facility]    [id number]

526 Univers



        12:45:00 12:45:00                                                 ity [facility]

 

        2020 Outpatient R       ROME [facility]    1026

[id number] Univers



        17:20:00 17:20:00                 AJAY                         ity [facility]

 

        2020 Nurse           SHANTHI Smalls    1.2.840.114 [id number]

91 Univers



        00:00:00 00:00:00 Triage          Aneatrice KAREN   350.1.13.10         

ity of



                                                HOSPITAL 4.2.7.2.686         Javed

as



                                                        [phone]         Medi

rafia



                                                        019             [place]

 

        2020-04-10 2020-04-10 Nurse           SHANTHI Wadsworth    1.2.840.114 [id number]

19 Univers



        00:00:00 00:00:00 Triage          Patient KAREN   350.1.13.10         it

y of



                                        Does Not HOSPITAL 4.2.7.2.686         [place]

[place]



                                        Have A          [phone]         Medi

rafia



                                                        019             [place]

 

        2020 Telemedici         JANEEN Vargas 1.2.840.114 [id number]

[id number] Univers



        09:06:23 12:39:14 ne Visit         [facility] 350.1.13.10       

  ity of



                                                CLINICS 4.2.7.2.686         [place]

[place]



                                                        [phone]         Medi

rafia



                                                        113             [place]

 

        2020 Outpatient R       JUAN C  [facility]    [id number]

130 Univers



        10:30:00 10:30:00                 [place]IV[place]                         ity 

[facility]

 

        2020 Telephone         JANEEN Caceres 1.2.840.114 [id number]

[id number] Univers



        00:00:00 00:00:00                 [facility] 350.1.13.10         i

ty of



                                                CLINICS 4.2.7.2.686         [place]

[place]



                                                        [phone]         Medi

rafia



                                                        113             [place]

 

        2019 Patient         Doctor  SHANTHI    1.2.840.114 [id number]

17 Univers



        00:00:00 00:00:00 Secure Msg         Unassigned, KAREN   350.1.13.10    

     ity of



                                        [facility] 4.2.7.2.686         Javed

as



                                                        [phone]         Medi

rafia



                                                        044             [place]

 

        2019 Office          JANEEN Caceres 1.2.018.986 1364

0592 Univers



        12:50:54 14:06:18 [facility]           [facility] 350.1.13.10         i

ty of



                                                CLINICS 4.2.7.2.686         [place]

[place]



                                                        [phone]         Medi

rafia



                                                        113             Branch

 

        2019 Orders          Doctor  SHANTHI    1.2.840.114 [id number]

94 Univers



        00:00:00 00:00:00 Only            Unassigned, KAREN   350.1.13.10       

  ity of



                                        [facility] 4.2.7.2.686         Javed

as



                                                        [phone]         Medi

rafia



                                                        009             Branch

 

        2019 Patient         Doctor  SHANTHI    1.2.840.114 [id number]

77 Univers



        00:00:00 00:00:00 Secure Msg         Unassigned, KAREN   350.1.13.10    

     ity of



                                        [facility] 4.2.7.2.686         Javed

as



                                                        [phone]         Medi

rafia



                                                        044             [place]







Results







           Test Description Test Time  Test Comments Results    Result Comments 

Source









                    TROPONIN I          2022 03:39:58 









                      Test Item  Value      Reference Range Interpretation Comme

nts









             TROPONIN I (test code = 0.001 ng/mL  See_Comment                [Au

tomated message] The



             [id number])                                         system which ge

nerated



                                                                 this result tra

nsmitted



                                                                 reference range

: <=0.034.



                                                                 The reference r

ottoniel was



                                                                 not used to int

erpret



                                                                 this result as



                                                                 normal/abnormal

.

 

             HOSSEIN (test code = HOSSEIN) Reference (Normal)                           



                          Range (defined by the                           



                          99th percentile                           



                          reference limit): <=                           



                          0.034 ng/mL Note:                           



                          Cardiac troponin begins                           



                          to rise 3-4 hours after                           



                          the onset of ischemia.                           



                          Repeat in 4-6 hours if                           



                          the sample was drawn                           



                          within 3-4 hours of the                           



                          onset of the symptom                           



                          and found normal.                           



                          Diagnosis of myocardial                           



                          injury is made with                           



                          acute changes in cTn                           



                          concentrations with at                           



                          least one serial sample                           



                          above the 99th                           



                          percentile upper                           



                          reference limit (URL),                           



                          taken together with the                           



                          patient's clinical                           



                          presentation. Biotin                           



                          has been reported to                           



                          cause a negative bias,                           



                          interpret results                           



                          relative to patient's                           



                          use of biotin.                           

 

             Lab Interpretation Normal                                 



             (test code = 96459-9)                                        



[facility]. METABOLIC PANEL (22175)2022 
03:28:25





             Test Item    Value        Reference Range Interpretation Comments

 

             NA (test code = 135 mmol/L   135-145                   



             [id number])                                         

 

             K (test code = 4.7 mmol/L   3.5-5.0                   



             0304715344)                                         

 

             CL (test code = 99 mmol/L                        



             7744680063)                                         

 

             CO2 TOTAL (test code = 24 mmol/L    23-31                     



             4416209209)                                         

 

             AGAP (test code =              2-16                      



             7108601432)                                         

 

             BUN (test code = 11 mg/dL     7-23                      



             7000033555)                                         

 

             GLUCOSE (test code = 403 mg/dL           H            



             0087736367)                                         

 

             CREATININE (test code = 0.52 mg/dL   0.50-1.04                 



             6944764132)                                         

 

             TOTAL BILI (test code = 0.3 mg/dL    0.1-1.1                   



             1800097226)                                         

 

             CALCIUM (test code = 9.8 mg/dL    8.6-10.6                  



             0761861730)                                         

 

             T PROTEIN (test code = 7.5 g/dL     6.3-8.2                   



             1975254565)                                         

 

             ALBUMIN (test code = 4.4 g/dL     3.5-5.0                   



             1601735585)                                         

 

             ALK PHOS (test code = 85 U/L                           



             5558169608)                                         

 

             ALTv (test code = 31 U/L       5-35                      



             1742-6)                                             

 

             AST(SGOT) (test code = 24 U/L       13-40                     



             [id number])                                         

 

             eGFR (test code =              mL/min/1.73m2              



             8509005788)                                         

 

             HOSSEIN (test code = HOSSEIN) Association of                           



                          Glomerular Filtration                           



                          Rate (GFR) and Staging                           



                          of Kidney Disease*                           



                          +---------------------                           



                          --+-------------------                           



                          --+-------------------                           



                          ------+| GFR                           



                          (mL/min/1.73 m2) ?|                           



                          With Kidney Damage ?|                           



                          ?Without Kidney                           



                          Damage+---------------                           



                          --------+-------------                           



                          --------+-------------                           



                          ------------+| ?>90 ?                           



                          ? ? ? ? ? ? ? ?|                           



                          ?Stage one ? ? ? ? ?|                           



                          ? Normal ? ? ? ? ? ? ?                           



                          ?+--------------------                           



                          ---+------------------                           



                          ---+------------------                           



                          -------+| ?60-89 ? ? ?                           



                          ? ? ? ? ?| ?Stage two                           



                          ? ? ? ? ?| ? Decreased                           



                          GFR ? ? ? ?                            



                          +---------------------                           



                          --+-------------------                           



                          --+-------------------                           



                          ------+| ?30-59 ? ? ?                           



                          ? ? ? ? ?| ?Stage                           



                          three ? ? ? ?| ? Stage                           



                          three ? ? ? ? ?                           



                          +---------------------                           



                          --+-------------------                           



                          --+-------------------                           



                          ------+| ?15-29 ? ? ?                           



                          ? ? ? ? ?| ?Stage four                           



                          ? ? ? ? | ? Stage four                           



                          ? ? ? ? ?                              



                          ?+--------------------                           



                          ---+------------------                           



                          ---+------------------                           



                          -------+| ?<15 (or                           



                          dialysis) ? ?| ?Stage                           



                          five ? ? ? ? | ? Stage                           



                          five ? ? ? ? ?                           



                          ?+--------------------                           



                          ---+------------------                           



                          ---+------------------                           



                          -------+ *Each stage                           



                          assumes the associated                           



                          GFR level has been in                           



                          effect for at least                           



                          three months. ?Stages                           



                          1 to 5, with or                           



                          without kidney                           



                          disease, indicate                           



                          chronic kidney                           



                          disease. Notes:                           



                          Determination of                           



                          stages one and two                           



                          (with eGFR                             



                          >59mL/min/1.73 m2)                           



                          requires estimation of                           



                          kidney damage for at                           



                          least three months as                           



                          defined by structural                           



                          or functional                           



                          abnormalities of the                           



                          kidney, manifested by                           



                          either:Pathological                           



                          abnormalities or                           



                          Markers of kidney                           



                          damage (including                           



                          abnormalities in the                           



                          composition of the                           



                          blood or urine or                           



                          abnormalities in                           



                          imaging tests).                           

 

             Lab Interpretation Abnormal                               



             (test code = 68293-7)                                        



[id number]





             Test Item    Value        Reference Range Interpretation Comments

 

             LIPASE (test code = 0264293649) 214 U/L      0-220                 

    

 

             Lab Interpretation (test code = Normal                             

    



             27047-2)                                            



[facility]59





             Test Item    Value        Reference Range Interpretation Comments

 

             APTT Patient (test              See_Comment                [Automat

ed



             code = 3173-2)                                        message] The



                                                                 system which



                                                                 generated this



                                                                 result



                                                                 transmitted



                                                                 reference range

:



                                                                 23 - 38 Seconds

.



                                                                 The reference



                                                                 range was not



                                                                 used to interpr

et



                                                                 this result as



                                                                 normal/abnormal

.

 

             HOSSEIN (test code = HOSSEIN) [facility] patient                           



                          population mean                           



                          normal value for                           



                          aPTT is 30                             



                          seconds.                               

 

             Lab Interpretation Normal                                 



             (test code = 31663-7)                                        



[facility] [id number] 03:06:58





             Test Item    Value        Reference Range Interpretation Comments

 

             PROTIME PATIENT (test              See_Comment                [Auto

mated message]



             code = 5964-2)                                        The system wh

ich



                                                                 generated this 

result



                                                                 transmitted ref

erence



                                                                 range: 12.0 - 1

4.7



                                                                 Seconds. The re

ference



                                                                 range was not u

sed to



                                                                 interpret this 

result



                                                                 as normal/abnor

mal.

 

             INR (test code = 6301-6)                                        Nor

mal INR <1.1;



                                                                 Warfarin Therap

eutic



                                                                 range 2.0 to 3.

0 or



                                                                 2.5 to 3.5, dep

ending



                                                                 upon the indica

tions.

 

             Lab Interpretation (test Normal                                 



             code = 16477-4)                                        



[id number] 02:39:16





             Test Item    Value        Reference Range Interpretation Comments

 

             WBC (test code =              See_Comment                [Automated



             6690-2)                                             message] The sy

stem



                                                                 which generated



                                                                 this result



                                                                 transmitted



                                                                 reference range

:



                                                                 4.30 - 11.10



                                                                 10*3/?L. The



                                                                 reference range

 was



                                                                 not used to



                                                                 interpret this



                                                                 result as



                                                                 normal/abnormal

.

 

             RBC (test code =              See_Comment  H             [Automated



             789-8)                                              message] The sy

stem



                                                                 which generated



                                                                 this result



                                                                 transmitted



                                                                 reference range

:



                                                                 3.93 - 5.25



                                                                 10*6/?L. The



                                                                 reference range

 was



                                                                 not used to



                                                                 interpret this



                                                                 result as



                                                                 normal/abnormal

.

 

             HGB (test code = 13.8 g/dL    11.6-15.0                 



             718-7)                                              

 

             HCT (test code = 42.7 %       35.7-45.2                 



             4544-3)                                             

 

             MCV (test code = 80.9 fL      80.6-95.5                 



             787-2)                                              

 

             MCH (test code = 26.1 pg      25.9-32.8                 



             785-6)                                              

 

             MCHC (test code = 32.3 g/dL    31.6-35.1                 



             786-4)                                              

 

             RDW-SD (test code = 37.9 fL      39.0-49.9    L            



             49885-9)                                            

 

             RDW-CV (test code = 13.0 %       12.0-15.5                 



             788-0)                                              

 

             PLT (test code =              See_Comment                [Automated



             777-3)                                              message] The sy

stem



                                                                 which generated



                                                                 this result



                                                                 transmitted



                                                                 reference range

:



                                                                 166 - 358 10*3/

?L.



                                                                 The reference r

ottoniel



                                                                 was not used to



                                                                 interpret this



                                                                 result as



                                                                 normal/abnormal

.

 

             MPV (test code = 12.6 fL      9.5-12.9                  



             13731-5)                                            

 

             NRBC/100 WBC (test              See_Comment                [Automat

ed



             code = 2544181817)                                        message] 

The system



                                                                 which generated



                                                                 this result



                                                                 transmitted



                                                                 reference range

:



                                                                 0.0 - 10.0 /100



                                                                 WBCs. The refer

ence



                                                                 range was not u

sed



                                                                 to interpret th

is



                                                                 result as



                                                                 normal/abnormal

.

 

             NRBC x10^3 (test code <0.01        See_Comment                [Auto

mated



             = [id number])                                        message] The s

ystem



                                                                 which generated



                                                                 this result



                                                                 transmitted



                                                                 reference range

:



                                                                 10*3/?L. The



                                                                 reference range

 was



                                                                 not used to



                                                                 interpret this



                                                                 result as



                                                                 normal/abnormal

.

 

             GRAN MAT (NEUT) % 61.9 %                                 



             (test code = 770-8)                                        

 

             IMM GRAN % (test code 0.30 %                                 



             = [id number])                                        

 

             LYMPH % (test code = 31.1 %                                 



             736-9)                                              

 

             MONO % (test code = 5.6 %                                  



             5905-5)                                             

 

             EOS % (test code = 0.8 %                                  



             713-8)                                              

 

             BASO % (test code = 0.3 %                                  



             706-2)                                              

 

             GRAN MAT x10^3(ANC) 6.38 10*3/uL 1.88-7.09                 



             (test code =                                        



             [id number])                                         

 

             IMM GRAN x10^3 (test 0.03 10*3/uL 0.00-0.06                 



             code = 9315161235)                                        

 

             LYMPH x10^3 (test code 3.21 10*3/uL 1.32-3.29                 



             = 731-0)                                            

 

             MONO x10^3 (test code 0.58 10*3/uL 0.33-0.92                 



             = 742-7)                                            

 

             EOS x10^3 (test code = 0.08 10*3/uL 0.03-0.39                 



             711-2)                                              

 

             BASO x10^3 (test code 0.03 10*3/uL 0.01-0.07                 



             = 704-7)                                            

 

             Lab Interpretation Abnormal                               



             (test code = 90569-7)                                        



[facility]

## 2023-06-25 NOTE — ER
Nurse's Notes                                                                                     

 [facility]                                                                 

Name: Cass Anderson                                                                             

Age: 23 yrs                                                                                       

Sex: Female                                                                                       

: 2000                                                                                   

MRN: [id number]                                                                                   

Arrival Date: 2023                                                                          

Time: 15:00                                                                                       

Account#: [id number]                                                                            

Bed Treatment                                                                                     

Private MD:                                                                                       

Diagnosis: Paronychia                                                                             

                                                                                                  

Presentation:                                                                                     

                                                                                             

15:09 Chief complaint: Patient states: R hand thumb pain, swelling beside nailbed for 3 days. ll1 

      No fever. Coronavirus screen: Vaccine status: Patient reports being unvaccinated.           

      Client denies travel out of the [place] in the last 14 days. At this time, the client does     

      not indicate any symptoms associated with coronavirus-19. Ebola Screen: Patient denies      

      travel to an Ebola-affected area in the 21 days before illness onset. Initial Sepsis        

      Screen: Does the patient meet any 2 criteria? No. Patient's initial sepsis screen is        

      negative. Does the patient have a suspected source of infection? Yes: Skin                  

      breakdown/wound. Risk Assessment: Do you want to hurt yourself or someone else? Patient     

      reports no desire to harm self or others. Onset of symptoms was 2023.              

15:09 Method Of Arrival: Ambulatory                                                           ll1 

15:09 Acuity: VERN 4                                                                           ll1 

                                                                                                  

Triage Assessment:                                                                                

15:10 General: Appears uncomfortable, Behavior is calm, cooperative, appropriate for age.     ll1 

      Pain: Complains of pain in left hand Pain currently is 7 out of 10 on a pain scale.         

      Quality of pain is described as aching. Derm: Abscess located on L thumb nail is < dime     

      sized.                                                                                      

                                                                                                  

Historical:                                                                                       

- Allergies:                                                                                      

15:08 No Known Allergies;                                                                     ll1 

- PMHx:                                                                                           

15:08 diabetes mellitus;                                                                      ll1 

- PSHx:                                                                                           

15:08 None;                                                                                   ll1 

                                                                                                  

- Immunization history:: Client reports having NOT received the Covid vaccine.                    

- Social history:: Smoking status: Patient denies any tobacco usage or history of.                

                                                                                                  

                                                                                                  

Screening:                                                                                        

15:30 [facility] ED Fall Risk Assessment (Adult) History of falling in the last 3 months,       nj1 

      including since admission No falls in past 3 months (0 pts) Confusion or Disorientation     

      No (0 pts) Intoxicated or Sedated No (0 pts) Impaired Gait No (0 pts) Mobility Assist       

      Device Used No (0 pt) Altered Elimination No (0 pt) Score/Fall Risk Level 0 - 2 = Low       

      Risk Oriented to surroundings, Maintained a safe environment, Hourly rounding (assess       

      needs \T\ fall precautionary measures) done. Abuse screen: Denies threats or abuse.         

      Denies injuries from another. Nutritional screening: No deficits noted. Tuberculosis        

      screening: No symptoms or risk factors identified.                                          

                                                                                                  

Assessment:                                                                                       

15:29 General: Appears in no apparent distress. comfortable, Behavior is calm, cooperative,   nj1 

      appropriate for age. Pain: Complains of pain in right thumbnail Pain currently is 7 out     

      of 10 on a pain scale. Neuro: Level of Consciousness is awake, alert, obeys commands,       

      Oriented to person, place, time, situation. Cardiovascular: Patient's skin is warm and      

      dry. Respiratory: Airway is patent Respiratory effort is even, unlabored.                   

                                                                                                  

Vital Signs:                                                                                      

15:09  / 86; Pulse 91; Resp 16; Temp 97.9; Pulse Ox 98% ; Weight 84.82 kg; Height 5 ft. ll1 

      0 in. ; Pain 7/10;                                                                          

15:09 Body Mass Index 36.52 (84.82 kg, 152.4 cm)                                              ll1 

15:09 Pain Scale: Adult                                                                       ll1 

                                                                                                  

ED Course:                                                                                        

15:01 Patient arrived in ED.                                                                  am2 

15:02 [facility]                                                          snw 

15:02 Bladimir Lazaro MD is Attending Physician.                                             snw 

15:08 Arm band placed on.                                                                     ll1 

15:10 Triage completed.                                                                       ll1 

15:12 Pastora Garcia, RN is Primary Nurse.                                                       nj1 

15:30 Patient has correct armband on for positive identification. Bed in low position. Call   nj 

      light in reach.                                                                             

18:26 No provider procedures requiring assistance completed. Patient did not have IV access   cm10

      during this emergency room visit.                                                           

                                                                                                  

Administered Medications:                                                                         

16:23 Drug: Lidocaine Mucous Membrane Gel 2 % 1 ea Volume: 15 ml; Route: Mucous Membrane;     cm10

18:21 Not Given (Pt doesn't have a ridee): traMADol PO 50 mg PO once                          cm10

18:26 Drug: Boostrix Tdap IM 0.5 ml Route: IM; Site: right deltoid;                           cm10

18:26 Drug: Doxycycline  mg Route: PO;                                                  cm10

                                                                                                  

                                                                                                  

Medication:                                                                                       

18:26 Vaccine Information Statement (VIS) provided today. Questions and/or concerns           cm10

      addressed. VIS edition date: 2021.                                               

                                                                                                  

Outcome:                                                                                          

18:12 Discharge ordered by MD. schmid 

18:26 Discharged to home ambulatory.                                                          cm10

18:26 Condition: good                                                                             

18:26 Discharge instructions given to patient, Instructed on discharge instructions, follow       

      up and referral plans. Demonstrated understanding of instructions, follow-up care,          

      medications, Prescriptions given X 2.                                                       

18:27 Patient left the ED.                                                                    cm10

                                                                                                  

Signatures:                                                                                       

Shae Pierson, FNP-C                   FNP-Csnw                                                  

Litzy Hancock am2                                                  

Liv Ulloa RN                       RN   ll1                                                  

Pastora Garcia RN                         RN   nj1                                                  

Esther Laboy RN                  RN   cm10                                                 

                                                                                                  

**************************************************************************************************